# Patient Record
(demographics unavailable — no encounter records)

---

## 2025-01-22 NOTE — HEALTH RISK ASSESSMENT
No [Good] : ~his/her~  mood as  good [Yes] : Yes [2 - 4 times a month (2 pts)] : 2-4 times a month (2 points) [3 or 4 (1 pt)] : 3 or 4  (1 point) [Never (0 pts)] : Never (0 points) [No] : In the past 12 months have you used drugs other than those required for medical reasons? No [0] : 2) Feeling down, depressed, or hopeless: Not at all (0) [PHQ-2 Negative - No further assessment needed] : PHQ-2 Negative - No further assessment needed [Audit-CScore] : 3 [de-identified] : improving [de-identified] : regularly [[id number]] : 0 [HIV test declined] : HIV test declined [Hepatitis C test declined] : Hepatitis C test declined [With Family] : lives with family [Employed] : employed [Single] : single [Sexually Active] : not sexually active [High Risk Behavior] : no high risk behavior [Feels Safe at Home] : Feels safe at home [Fully functional (bathing, dressing, toileting, transferring, walking, feeding)] : Fully functional (bathing, dressing, toileting, transferring, walking, feeding) [Fully functional (using the telephone, shopping, preparing meals, housekeeping, doing laundry, using] : Fully functional and needs no help or supervision to perform IADLs (using the telephone, shopping, preparing meals, housekeeping, doing laundry, using transportation, managing medications and managing finances) [Reports changes in hearing] : Reports no changes in hearing [Reports changes in vision] : Reports no changes in vision [Reports changes in dental health] : Reports no changes in dental health [Never] : Never

## 2025-01-22 NOTE — ASSESSMENT
[FreeTextEntry1] : Annual physical: f/u routine labwork Obesity: Recommend low fat diet, wt loss, exercise, and DASH diet, improving exercise Psoriasis: recommend evaluation with dermatology Hyperpigmentation LE b/l: f/u labwork including CMP/TSH/A1c/B12 ordered, f/u vascular surgery, f/u dermatology RTC 2 wks

## 2025-01-22 NOTE — PHYSICAL EXAM
[Normal] : affect was normal and insight and judgment were intact [de-identified] : b/l lower extremities with hyperpigmentation

## 2025-01-22 NOTE — HISTORY OF PRESENT ILLNESS
[FreeTextEntry1] :  ALLEY is a 24 year-old male here for a NP-CPE [de-identified] : 23 yo male presents for annual physical. reports history of psoriasis. he does get some itchiness. He uses moisturizer to improve symptoms. In addition, he reports darkening skin on both legs, going on for years. Began at age 10. Denies fever, chills, cp, palpitations, sob, nvcd.

## 2025-03-04 NOTE — HISTORY OF PRESENT ILLNESS
[FreeTextEntry1] : follow up weight loss  [de-identified] : 25 yo male presents with concern for obesity. he is interested in weight loss medication. Denies fever, chills, cp, palpitations, sob, nvcd.

## 2025-03-04 NOTE — ASSESSMENT
[FreeTextEntry1] : Obesity: Recommend low fat diet, wt loss, exercise, and DASH diet, start zepbound 2.5 mg sc qweekly (no family or personal hx of thyroid CA/MEN2, no pancreatitis, no suicidality) RTC 4 wks

## 2025-03-19 NOTE — PHYSICAL EXAM
[2+] : left 2+ [JVD] : no jugular venous distention  [Alert] : alert [Oriented to Person] : oriented to person [Oriented to Place] : oriented to place [Oriented to Time] : oriented to time [Calm] : calm [FreeTextEntry1] : LE warm, well perfused, brisk cap refill throughout; no dependent rubor/elevation pallor or evidence of athero/thromboembolism scattered hemosiderin deposition bilateral distal 2/3 calves, essentially equal  no evidence of cellulitis  no papillomas  scattered varicosities > 3mm along with reticular veins;  no superficial thrombophlebitis  no active ulcerations  no skin fibrosis  motor/sensory inatct  [de-identified] : no mirella joint deformity  [de-identified] : scattered hemosiderin deposition

## 2025-03-19 NOTE — DATA REVIEWED
[FreeTextEntry1] : Venous duplex today:  no evidence of acute or chronic DVT or deep venous insufficiency  right small saphenous vein measure 5.1mm with reflux, associated incompetent varicosities > 3mm noted ;  left great saphenous vein measures 6.4 mm with a 10 cm length segment with reflux and associated incompetent varicosties > 3mm noted

## 2025-03-19 NOTE — HISTORY OF PRESENT ILLNESS
[FreeTextEntry1] : 24 year old male with no significant PMH presents for vascular consultation for progressively worsening skin discoloration of both calves.  States he first noticed skin discoloration while on a ski trip after removing his boots at the age of ten.  He has since noticed that it has gradually worsened over the years.  Also complains of some dilated varicosities of both legs with some associated leg discomfort with prolonged dependency.  Denies previous venous interventions, h/o DVT, SVT or thrombophilia.  Denies previous venous interventions. Denies a known family history of hemosiderin deposition or similar skin appearance.  Has not regularly used compression hose. Denies previous IVCF placement or venous catheterizations.    Has previously seen dermatology, no biopsy was performed and referral to vascular was recommended.   + paternal grandmother with a h/o varicosities.   Denies h/o diabetes or autoimmune disease.

## 2025-03-19 NOTE — ASSESSMENT
[FreeTextEntry1] : 24 year old overweight male with longstanding bilateral lower extremity hemosiderin deposition along with superficial venous insufficiency;  No evidence of deep venous thrombosis, post thrombotic disease or deep venous reflux;   Venous insufficiency may be contributing to hemosiderin deposition but is unlikely the primary etiology given onset prior to adolescence.   -would likely benefit from elective venous intervention with  1. Left GSV RFA 2. Left SAP UGS  3. Right SSV RFA  4. Right SAP UGS  -will call if he wishes to schedule once his insurance changes  -advised to maintain adequate skin moisturization, rx for prescription compression hose 20-30mmHG knee and thigh high provided  -may benefit from dermatology follow up with potential biopsy

## 2025-03-19 NOTE — PHYSICAL EXAM
[2+] : left 2+ [JVD] : no jugular venous distention  [Alert] : alert [Oriented to Person] : oriented to person [Oriented to Place] : oriented to place [Oriented to Time] : oriented to time [Calm] : calm [FreeTextEntry1] : LE warm, well perfused, brisk cap refill throughout; no dependent rubor/elevation pallor or evidence of athero/thromboembolism scattered hemosiderin deposition bilateral distal 2/3 calves, essentially equal  no evidence of cellulitis  no papillomas  scattered varicosities > 3mm along with reticular veins;  no superficial thrombophlebitis  no active ulcerations  no skin fibrosis  motor/sensory inatct  [de-identified] : no mirella joint deformity  [de-identified] : scattered hemosiderin deposition

## 2025-06-26 NOTE — ASSESSMENT
[FreeTextEntry1] : Obesity: Recommend low fat diet, wt loss, exercise, and DASH diet, start zepbound 5 mg sc qweekly RTC 4 wks

## 2025-06-26 NOTE — HISTORY OF PRESENT ILLNESS
[FreeTextEntry1] :  ALLEY is a 25 year-old male here for a follow up [de-identified] : 24 yo male presents for follow up of obesity. reports feeling well with zepbound. he reports losing weight. Denies fever, chills, cp, palpitations, sob, nvcd.

## 2025-07-03 NOTE — ASSESSMENT
[FreeTextEntry1] : Obesity/diarrhea/medication side effect: temporal relationship with medication, likely medication side effect vs viral gastroenteritis, improved significantly, recommend increased hydation, diet as tolerated, will continue with zepbound for now, discussed small meals, bland food RTC 4 wks

## 2025-07-03 NOTE — HISTORY OF PRESENT ILLNESS
[FreeTextEntry1] :  ALLEY is a 25 year-old male here for a follow up [de-identified] : 24 yo male presents for follow up of obesity. he is taking zepbound. he notes having diarrhea for 2 days. he says it has improved now. He would like to continue medication. Denies fever, chills, cp, palpitations, sob.

## 2025-07-03 NOTE — REVIEW OF SYSTEMS
[Abdominal Pain] : no abdominal pain [Nausea] : no nausea [Diarrhea] : diarrhea [Vomiting] : no vomiting [Heartburn] : no heartburn [Melena] : no melena [Negative] : Psychiatric

## 2025-07-11 NOTE — PROCEDURE
[FreeTextEntry1] : L GSV RFA [FreeTextEntry2] : CVI [FreeTextEntry3] : Preoperative Diagnosis: Varicose veins, symptomatic, chronic venous insufficiency (I87.2).          Postoperative Diagnosis: Same      Attending: Yoel Schmidt MD     Assistant: n/a       				      Procedure:      1.	Endovenous Radiofrequency Ablation of the L GSV (80102)            Anesthesia:x Local/Tumescent (50cc 1% Lidocaine in 500cc NS) _ MAC      Complications: none      Cycles: 4           Length of segment treated:16cm            Description: Chart was reviewed, including ultrasound report and operative plan. Consent was obtained from the patient, risks and benefits of the procedure were explained. Prior to the start of the procedure, the patient was examined in the standing position. The skin was marked to identify superficial varicosities.  The patient was then placed on the procedure table and the entire lower extremity was prepped and a sterile field using barriers was created.  Time out was performed.            1. Using ultrasound guidance a 19-guage needle was placed directly into the saphenous vein. The guide wire was then placed and the needle removed.  A 7F sheath for the RFA catheter was then introduced along the guide wire.  The RFA catheter was then introduced through the sheath.  The catheter tip position was then confirmed at approximately 2 cm from the junction.  The perivenous tissue of the saphenous vein was then anesthetized using Tumescent anesthesia,50 cc of 0.1% lidocaine solution. The catheter placement was again confirmed using ultrasound 2cm from the saphenous junction. Segmental ablation of the saphenous was performed with radiofrequency energy using 120 degrees Celsius for 20 sec each segment (each segment was treated twice). The catheter was removed and local pressure applied.  Ultrasound was again carried out, which demonstrated significant venospasm of the saphenous vein and no evidence of trauma to the deep veins.                   The incisions and injection sites were covered with 4x4 gauze and abdominal pads and the extremity was wrapped with kerlix and Coban using standard technique.  This was followed by application of a 20-30 mm Hg graduated venous compression hose.              The patient was given oral and written instructions for aftercare, which included frequent ambulation and leg elevation.  The patient was instructed to take NSAIDs.  The patient was scheduled for a follow-up visit in approximately 5-7 days with a venous duplex ultrasound of the treated leg to evaluate for success of procedure and evaluate for any deep vein thrombosis. The patient tolerated the procedure well and left the office in excellent condition ambulating without difficulty.

## 2025-07-23 NOTE — END OF VISIT
[FreeTextEntry2] :   I, Dr. Schmidt was physically present for and performed the above surgical procedure with DAMON Diaz present as an assistant.

## 2025-07-23 NOTE — PROCEDURE
[FreeTextEntry1] : Left SAP UGS  [FreeTextEntry2] : Chronic Venous Insufficiency, Varicose veins with pain [FreeTextEntry3] : Preoperative Diagnosis: Varicose veins, symptomatic, chronic venous insufficiency (I87.2).     Postoperative Diagnosis: Same    Attending:  Yoel Schmidt MD    Assistant: DAMON Diaz   Procedure:   1. Left leg, Ultrasound guided sclerotherapy, multiple veins (70195)   2. Left Leg Ambulatory Phlebectomy x 22 ([id number])    Anesthesia:  Local/Tumescent (50cc 1% Lidocaine in 500cc NS)    Complications: none     Description: Chart was reviewed, including ultrasound report and operative plan. Consent was obtained from the patient, risks and benefits of the procedure were explained.  Risks which include but are not limited to infection, bleeding, hematoma, nerve injury/lymphatic injury, superficial venous thrombosis, deep venous thrombosis, pulmonary embolism, heart attack, stroke, neurological symptoms, skin ulceration / skin pigmentation, unfavorable cosmetic result, failure of procedure and need for additional intervention.  Prior to the start of the procedure, the patient was examined in the standing position. The skin was marked to identify superficial varicosities.  The patient was then placed on the procedure table and the entire lower extremity was prepped and a sterile field using barriers was created.  Time out was performed.    1. The deeper varicosities in the left leg were treated with ultrasound-guided sclerotherapy. Ultrasound was used to identify the extent and distribution of the deeper varicosities. Multiple varicosities were accessed with a 25G needle and injected with 1% STS. Compression of the veins using ultrasound was performed.  Patient was kept in Trendelenburg position for 10 minutes and instructed to perform dorsi/plantar flexion of the foot.     2. The area of the marked surface varicosities was anesthetized using tumescent anesthesia, 350 cc of 0.1% lidocaine solution. The marked varicose veins were then extracted using a micro-incisional avulsion technique and 22 separate stab incisions were made with a 16G Nokor Needle along the course of the varicosities.  The diseased vein segments were removed using phlebectomy hooks and mosquitoes.  Hemostasis was obtained with compression and wash-out of the subcutaneous space.   The incisions and injection sites were covered with 4x4 gauze and abdominal pads and the extremity was wrapped with kerlix and Coban using standard technique.  This was followed by application of a 20-30 mm Hg graduated venous compression hose.      The patient was given oral and written instructions for aftercare, which included frequent ambulation and leg elevation.  The patient was instructed to take NSAIDs.  The patient was scheduled for a follow-up visit in approximately 5-7 days with a venous duplex ultrasound of the treated leg to evaluate for success of procedure and evaluate for any deep vein thrombosis. The patient tolerated the procedure well and left the office in excellent condition ambulating without difficulty.

## 2025-07-23 NOTE — PROCEDURE
[FreeTextEntry1] : Left SAP UGS  [FreeTextEntry2] : Chronic Venous Insufficiency, Varicose veins with pain [FreeTextEntry3] : Preoperative Diagnosis: Varicose veins, symptomatic, chronic venous insufficiency (I87.2).     Postoperative Diagnosis: Same    Attending:  Yoel Schmidt MD    Assistant: DAMON Diaz   Procedure:   1. Left leg, Ultrasound guided sclerotherapy, multiple veins ([id number])   2. Left Leg Ambulatory Phlebectomy x 22 ([id number])    Anesthesia:  Local/Tumescent (50cc 1% Lidocaine in 500cc NS)    Complications: none     Description: Chart was reviewed, including ultrasound report and operative plan. Consent was obtained from the patient, risks and benefits of the procedure were explained.  Risks which include but are not limited to infection, bleeding, hematoma, nerve injury/lymphatic injury, superficial venous thrombosis, deep venous thrombosis, pulmonary embolism, heart attack, stroke, neurological symptoms, skin ulceration / skin pigmentation, unfavorable cosmetic result, failure of procedure and need for additional intervention.  Prior to the start of the procedure, the patient was examined in the standing position. The skin was marked to identify superficial varicosities.  The patient was then placed on the procedure table and the entire lower extremity was prepped and a sterile field using barriers was created.  Time out was performed.    1. The deeper varicosities in the left leg were treated with ultrasound-guided sclerotherapy. Ultrasound was used to identify the extent and distribution of the deeper varicosities. Multiple varicosities were accessed with a 25G needle and injected with 1% STS. Compression of the veins using ultrasound was performed.  Patient was kept in Trendelenburg position for 10 minutes and instructed to perform dorsi/plantar flexion of the foot.     2. The area of the marked surface varicosities was anesthetized using tumescent anesthesia, 350 cc of 0.1% lidocaine solution. The marked varicose veins were then extracted using a micro-incisional avulsion technique and 22 separate stab incisions were made with a 16G Nokor Needle along the course of the varicosities.  The diseased vein segments were removed using phlebectomy hooks and mosquitoes.  Hemostasis was obtained with compression and wash-out of the subcutaneous space.   The incisions and injection sites were covered with 4x4 gauze and abdominal pads and the extremity was wrapped with kerlix and Coban using standard technique.  This was followed by application of a 20-30 mm Hg graduated venous compression hose.      The patient was given oral and written instructions for aftercare, which included frequent ambulation and leg elevation.  The patient was instructed to take NSAIDs.  The patient was scheduled for a follow-up visit in approximately 5-7 days with a venous duplex ultrasound of the treated leg to evaluate for success of procedure and evaluate for any deep vein thrombosis. The patient tolerated the procedure well and left the office in excellent condition ambulating without difficulty.

## 2025-07-25 NOTE — PROCEDURE
[FreeTextEntry1] : R SSV RFA [FreeTextEntry2] : VV with pain [FreeTextEntry3] : Preoperative Diagnosis: Varicose veins, symptomatic, chronic venous insufficiency (I87.2).          Postoperative Diagnosis: Same      Attending: Yoel Schmidt MD     Assistant: SHEBA Ahn PA-C       				      Procedure:      1.	Endovenous Radiofrequency Ablation of the R GSV ([id number])            Anesthesia: x Local/Tumescent (50cc 1% Lidocaine in 500cc NS) _ MAC      Complications: none      Cycles: 4          Length of segment treated:16 cm            Description: Chart was reviewed, including ultrasound report and operative plan. Consent was obtained from the patient, risks and benefits of the procedure were explained. Prior to the start of the procedure, the patient was examined in the standing position. The skin was marked to identify superficial varicosities.  The patient was then placed on the procedure table and the entire lower extremity was prepped and a sterile field using barriers was created.  Time out was performed.            1. Using ultrasound guidance a 19-guage needle was placed directly into the saphenous vein. The guide wire was then placed and the needle removed.  A 7F sheath for the RFA catheter was then introduced along the guide wire.  The RFA catheter was then introduced through the sheath.  The catheter tip position was then confirmed at approximately 2 cm from the junction.  The perivenous tissue of the saphenous vein was then anesthetized using Tumescent anesthesia, 50 cc of 0.1% lidocaine solution. The catheter placement was again confirmed using ultrasound 2cm from the saphenous junction. Segmental ablation of the saphenous was performed with radiofrequency energy using 120 degrees Celsius for 20 sec each segment (each segment was treated twice). The catheter was removed and local pressure applied.  Ultrasound was again carried out, which demonstrated significant venospasm of the saphenous vein and no evidence of trauma to the deep veins.                   The incisions and injection sites were covered with 4x4 gauze and abdominal pads and the extremity was wrapped with kerlix and Coban using standard technique.  This was followed by application of a 20-30 mm Hg graduated venous compression hose.              The patient was given oral and written instructions for aftercare, which included frequent ambulation and leg elevation.  The patient was instructed to take NSAIDs.  The patient was scheduled for a follow-up visit in approximately 5-7 days with a venous duplex ultrasound of the treated leg to evaluate for success of procedure and evaluate for any deep vein thrombosis. The patient tolerated the procedure well and left the office in excellent condition ambulating without difficulty.